# Patient Record
Sex: FEMALE | Race: WHITE | NOT HISPANIC OR LATINO | Employment: FULL TIME | URBAN - METROPOLITAN AREA
[De-identification: names, ages, dates, MRNs, and addresses within clinical notes are randomized per-mention and may not be internally consistent; named-entity substitution may affect disease eponyms.]

---

## 2023-01-19 PROBLEM — G47.00 INSOMNIA: Status: ACTIVE | Noted: 2023-01-19

## 2023-01-19 PROBLEM — N94.89 VAGINAL BURNING: Status: ACTIVE | Noted: 2023-01-19

## 2023-01-19 PROBLEM — N94.9 VAGINAL BURNING: Status: ACTIVE | Noted: 2023-01-19

## 2023-01-19 PROBLEM — K22.4 INEFFECTIVE ESOPHAGEAL MOTILITY: Status: ACTIVE | Noted: 2023-01-19

## 2023-01-19 PROBLEM — D72.819 LEUKOCYTOPENIA: Status: ACTIVE | Noted: 2023-01-19

## 2023-01-19 PROBLEM — M34.1: Status: ACTIVE | Noted: 2023-01-19

## 2023-01-19 PROBLEM — M35.1 MIXED CONNECTIVE TISSUE DISEASE (MULTI): Status: ACTIVE | Noted: 2023-01-19

## 2023-01-19 PROBLEM — K21.9 GERD (GASTROESOPHAGEAL REFLUX DISEASE): Status: ACTIVE | Noted: 2023-01-19

## 2023-01-19 PROBLEM — K20.90 ESOPHAGITIS: Status: ACTIVE | Noted: 2023-01-19

## 2023-01-19 RX ORDER — OMEPRAZOLE 40 MG/1
40 CAPSULE, DELAYED RELEASE ORAL
COMMUNITY
End: 2023-03-06 | Stop reason: SDUPTHER

## 2023-01-19 RX ORDER — TRAZODONE HYDROCHLORIDE 100 MG/1
TABLET ORAL NIGHTLY
COMMUNITY
End: 2023-03-06 | Stop reason: SDUPTHER

## 2023-03-06 ENCOUNTER — OFFICE VISIT (OUTPATIENT)
Dept: PRIMARY CARE | Facility: CLINIC | Age: 38
End: 2023-03-06
Payer: COMMERCIAL

## 2023-03-06 VITALS
HEIGHT: 65 IN | HEART RATE: 61 BPM | SYSTOLIC BLOOD PRESSURE: 92 MMHG | DIASTOLIC BLOOD PRESSURE: 64 MMHG | OXYGEN SATURATION: 98 % | BODY MASS INDEX: 22.82 KG/M2 | RESPIRATION RATE: 18 BRPM | WEIGHT: 137 LBS

## 2023-03-06 DIAGNOSIS — Z00.00 HEALTH MAINTENANCE EXAMINATION: ICD-10-CM

## 2023-03-06 DIAGNOSIS — G89.29 CHRONIC MIDLINE LOW BACK PAIN WITHOUT SCIATICA: ICD-10-CM

## 2023-03-06 DIAGNOSIS — G47.00 INSOMNIA, UNSPECIFIED TYPE: ICD-10-CM

## 2023-03-06 DIAGNOSIS — M54.50 CHRONIC MIDLINE LOW BACK PAIN WITHOUT SCIATICA: ICD-10-CM

## 2023-03-06 DIAGNOSIS — Z13.6 SCREENING FOR CARDIOVASCULAR CONDITION: ICD-10-CM

## 2023-03-06 DIAGNOSIS — K21.00 GASTROESOPHAGEAL REFLUX DISEASE WITH ESOPHAGITIS WITHOUT HEMORRHAGE: Primary | ICD-10-CM

## 2023-03-06 DIAGNOSIS — M34.1 SCLERODERMA OF ESOPHAGUS (MULTI): ICD-10-CM

## 2023-03-06 PROBLEM — K20.90 ESOPHAGITIS: Status: RESOLVED | Noted: 2023-01-19 | Resolved: 2023-03-06

## 2023-03-06 PROBLEM — N94.9 VAGINAL BURNING: Status: RESOLVED | Noted: 2023-01-19 | Resolved: 2023-03-06

## 2023-03-06 PROBLEM — D72.819 LEUKOCYTOPENIA: Status: RESOLVED | Noted: 2023-01-19 | Resolved: 2023-03-06

## 2023-03-06 PROBLEM — K22.4 INEFFECTIVE ESOPHAGEAL MOTILITY: Status: RESOLVED | Noted: 2023-01-19 | Resolved: 2023-03-06

## 2023-03-06 PROBLEM — N94.89 VAGINAL BURNING: Status: RESOLVED | Noted: 2023-01-19 | Resolved: 2023-03-06

## 2023-03-06 PROCEDURE — 99385 PREV VISIT NEW AGE 18-39: CPT | Performed by: NURSE PRACTITIONER

## 2023-03-06 PROCEDURE — 1036F TOBACCO NON-USER: CPT | Performed by: NURSE PRACTITIONER

## 2023-03-06 RX ORDER — OMEPRAZOLE 40 MG/1
40 CAPSULE, DELAYED RELEASE ORAL
Qty: 180 CAPSULE | Refills: 3 | Status: SHIPPED | OUTPATIENT
Start: 2023-03-06 | End: 2024-04-23 | Stop reason: SDUPTHER

## 2023-03-06 RX ORDER — TRAZODONE HYDROCHLORIDE 100 MG/1
100 TABLET ORAL DAILY PRN
Qty: 180 TABLET | Refills: 3 | Status: SHIPPED | OUTPATIENT
Start: 2023-03-06 | End: 2024-04-23 | Stop reason: SDUPTHER

## 2023-03-06 ASSESSMENT — PROMIS GLOBAL HEALTH SCALE
CARRYOUT_SOCIAL_ACTIVITIES: EXCELLENT
RATE_AVERAGE_PAIN: 3
RATE_GENERAL_HEALTH: EXCELLENT
RATE_PHYSICAL_HEALTH: VERY GOOD
RATE_SOCIAL_SATISFACTION: VERY GOOD
CARRYOUT_PHYSICAL_ACTIVITIES: COMPLETELY
RATE_QUALITY_OF_LIFE: VERY GOOD
RATE_MENTAL_HEALTH: VERY GOOD
EMOTIONAL_PROBLEMS: RARELY

## 2023-03-06 NOTE — PROGRESS NOTES
Subjective   Patient ID: Ca Felix is a 38 y.o. female who presents for No chief complaint on file..    HPI     Review of Systems    Objective   There were no vitals taken for this visit.    Physical Exam    Assessment/Plan

## 2023-03-06 NOTE — PROGRESS NOTES
Subjective   Patient ID: Ca Felix is a 38 y.o. female who presents for Annual Exam (Patient would also like to discuss her increasing LBP, reports no other concerns at this time. ).    Well Adult Physical   Patient here for a comprehensive physical exam.The patient reports problems - low back pain    Do you take any herbs or supplements that were not prescribed by a doctor? no   Are you taking calcium supplements? no   Are you taking aspirin daily? no     History:  LMP: No LMP recorded.  Last pap date:   Abnormal pap? no  : 0  Para: 0          Back Pain  This is a recurrent problem. The current episode started 1 to 4 weeks ago. The problem occurs intermittently. The problem has been waxing and waning since onset. The pain is present in the lumbar spine. The pain does not radiate. The pain is at a severity of 4/10. The pain is Worse during the night. The symptoms are aggravated by bending, twisting and standing. Stiffness is present All day. Pertinent negatives include no abdominal pain, bladder incontinence, bowel incontinence, dysuria, fever, headaches, leg pain, numbness, paresis, paresthesias, pelvic pain, perianal numbness, tingling, weakness or weight loss. She has tried chiropractic manipulation, NSAIDs, heat, analgesics, home exercises and muscle relaxant for the symptoms. The treatment provided moderate relief.      38 year old female presents for CPE    Last Pap 2 years ago - follows Dr. Koenig    Has had increased low back pain - has had chronic back pain x 'years'.   Denies numbness and tingling.  Denies changes in bowel or bladder habits.        Review of Systems   Constitutional:  Negative for fever and weight loss.   Gastrointestinal:  Negative for abdominal pain and bowel incontinence.   Genitourinary:  Negative for bladder incontinence, dysuria and pelvic pain.   Musculoskeletal:  Positive for back pain.   Neurological:  Negative for tingling, weakness, numbness, headaches and  "paresthesias.   All other systems reviewed and are negative.      Objective   BP 92/64   Pulse 61   Resp 18   Ht 1.651 m (5' 5\")   Wt 62.1 kg (137 lb)   SpO2 98%   BMI 22.80 kg/m²     Physical Exam  Vitals and nursing note reviewed.   Constitutional:       General: She is not in acute distress.     Appearance: Normal appearance.   HENT:      Head: Normocephalic and atraumatic.      Right Ear: Tympanic membrane, ear canal and external ear normal. There is no impacted cerumen.      Left Ear: Tympanic membrane, ear canal and external ear normal. There is no impacted cerumen.      Nose: Nose normal. No congestion.      Mouth/Throat:      Mouth: Mucous membranes are moist.      Pharynx: No oropharyngeal exudate.   Eyes:      Extraocular Movements: Extraocular movements intact.      Conjunctiva/sclera: Conjunctivae normal.      Pupils: Pupils are equal, round, and reactive to light.   Cardiovascular:      Rate and Rhythm: Normal rate and regular rhythm.      Pulses: Normal pulses.      Heart sounds: Normal heart sounds.   Pulmonary:      Effort: Pulmonary effort is normal.      Breath sounds: Normal breath sounds.   Abdominal:      General: Abdomen is flat. Bowel sounds are normal.      Palpations: Abdomen is soft.   Musculoskeletal:         General: Tenderness present. Normal range of motion.      Cervical back: Normal range of motion.      Comments: TTP low lumbar region approx L3-L5 midline in location   Skin:     General: Skin is warm.      Capillary Refill: Capillary refill takes less than 2 seconds.   Neurological:      General: No focal deficit present.      Mental Status: She is alert. Mental status is at baseline. She is disoriented.      Coordination: Coordination normal.      Gait: Gait normal.   Psychiatric:         Mood and Affect: Mood normal.         Behavior: Behavior normal.         Thought Content: Thought content normal.         Judgment: Judgment normal.         Assessment/Plan   Problem List " Items Addressed This Visit          Nervous    Insomnia       Digestive    GERD (gastroesophageal reflux disease) - Primary     Follows GI  Stable   Taking omeprazole          Relevant Medications    traZODone (Desyrel) 100 mg tablet    omeprazole (PriLOSEC) 40 mg DR capsule    Scleroderma of esophagus (CMS/HCC)     Follows GI  Stable   Taking omeprazole             Other    Health maintenance examination     Cbc,cmp, lipid, vit d, vit b, TSH  Pap UTD due 2026  Mammogram due at age 41         Relevant Orders    CBC    Comprehensive Metabolic Panel    Vitamin D 1,25 Dihydroxy    Vitamin B12    TSH with reflex to Free T4 if abnormal    Chronic midline low back pain without sciatica     Had improvement with medrol dose pack and NSAIDS  Pain will return every few months  Xray lumbar region         Relevant Orders    XR lumbar spine complete 4+ views (Completed)     Other Visit Diagnoses       Screening for cardiovascular condition        Relevant Orders    Lipid Panel

## 2023-03-12 PROBLEM — G89.29 CHRONIC MIDLINE LOW BACK PAIN WITHOUT SCIATICA: Status: ACTIVE | Noted: 2023-03-12

## 2023-03-12 PROBLEM — M54.50 CHRONIC MIDLINE LOW BACK PAIN WITHOUT SCIATICA: Status: ACTIVE | Noted: 2023-03-12

## 2023-03-12 PROBLEM — M35.1 MIXED CONNECTIVE TISSUE DISEASE (MULTI): Status: RESOLVED | Noted: 2023-01-19 | Resolved: 2023-03-12

## 2023-03-12 ASSESSMENT — ENCOUNTER SYMPTOMS
PARESTHESIAS: 0
FEVER: 0
PARESIS: 0
NUMBNESS: 0
DYSURIA: 0
HEADACHES: 0
WEIGHT LOSS: 0
ABDOMINAL PAIN: 0
BACK PAIN: 1
WEAKNESS: 0
PERIANAL NUMBNESS: 0
TINGLING: 0
BOWEL INCONTINENCE: 0
LEG PAIN: 0

## 2023-03-12 NOTE — ASSESSMENT & PLAN NOTE
Had improvement with medrol dose pack and NSAIDS  Pain will return every few months  Xray lumbar region

## 2023-03-14 DIAGNOSIS — M54.50 CHRONIC MIDLINE LOW BACK PAIN WITHOUT SCIATICA: Primary | ICD-10-CM

## 2023-03-14 DIAGNOSIS — G89.29 CHRONIC MIDLINE LOW BACK PAIN WITHOUT SCIATICA: Primary | ICD-10-CM

## 2023-10-25 ENCOUNTER — APPOINTMENT (OUTPATIENT)
Dept: PRIMARY CARE | Facility: CLINIC | Age: 38
End: 2023-10-25
Payer: COMMERCIAL

## 2023-11-22 ENCOUNTER — APPOINTMENT (OUTPATIENT)
Dept: PRIMARY CARE | Facility: CLINIC | Age: 38
End: 2023-11-22
Payer: COMMERCIAL

## 2023-12-01 ENCOUNTER — APPOINTMENT (OUTPATIENT)
Dept: PRIMARY CARE | Facility: CLINIC | Age: 38
End: 2023-12-01
Payer: COMMERCIAL

## 2024-03-14 ASSESSMENT — PROMIS GLOBAL HEALTH SCALE
RATE_GENERAL_HEALTH: VERY GOOD
RATE_MENTAL_HEALTH: GOOD
EMOTIONAL_PROBLEMS: OFTEN
RATE_QUALITY_OF_LIFE: GOOD
RATE_PHYSICAL_HEALTH: EXCELLENT
RATE_AVERAGE_PAIN: 1
CARRYOUT_SOCIAL_ACTIVITIES: VERY GOOD
RATE_AVERAGE_FATIGUE: MILD
RATE_SOCIAL_SATISFACTION: GOOD
CARRYOUT_PHYSICAL_ACTIVITIES: COMPLETELY

## 2024-03-18 ENCOUNTER — OFFICE VISIT (OUTPATIENT)
Dept: PRIMARY CARE | Facility: CLINIC | Age: 39
End: 2024-03-18
Payer: COMMERCIAL

## 2024-03-18 VITALS
HEIGHT: 65 IN | BODY MASS INDEX: 22.26 KG/M2 | DIASTOLIC BLOOD PRESSURE: 70 MMHG | WEIGHT: 133.6 LBS | RESPIRATION RATE: 18 BRPM | OXYGEN SATURATION: 99 % | SYSTOLIC BLOOD PRESSURE: 110 MMHG | HEART RATE: 71 BPM

## 2024-03-18 DIAGNOSIS — Z13.6 SCREENING FOR CARDIOVASCULAR CONDITION: ICD-10-CM

## 2024-03-18 DIAGNOSIS — F41.8 ANXIETY WITH DEPRESSION: ICD-10-CM

## 2024-03-18 DIAGNOSIS — Z00.00 HEALTH MAINTENANCE EXAMINATION: Primary | ICD-10-CM

## 2024-03-18 PROCEDURE — 1036F TOBACCO NON-USER: CPT | Performed by: NURSE PRACTITIONER

## 2024-03-18 PROCEDURE — 99395 PREV VISIT EST AGE 18-39: CPT | Performed by: NURSE PRACTITIONER

## 2024-03-18 RX ORDER — FLUOXETINE 10 MG/1
10 CAPSULE ORAL DAILY
Qty: 30 CAPSULE | Refills: 1 | Status: SHIPPED | OUTPATIENT
Start: 2024-03-18 | End: 2024-04-09

## 2024-03-18 ASSESSMENT — PATIENT HEALTH QUESTIONNAIRE - PHQ9
7. TROUBLE CONCENTRATING ON THINGS, SUCH AS READING THE NEWSPAPER OR WATCHING TELEVISION: MORE THAN HALF THE DAYS
9. THOUGHTS THAT YOU WOULD BE BETTER OFF DEAD, OR OF HURTING YOURSELF: NOT AT ALL
8. MOVING OR SPEAKING SO SLOWLY THAT OTHER PEOPLE COULD HAVE NOTICED. OR THE OPPOSITE, BEING SO FIGETY OR RESTLESS THAT YOU HAVE BEEN MOVING AROUND A LOT MORE THAN USUAL: SEVERAL DAYS
4. FEELING TIRED OR HAVING LITTLE ENERGY: NEARLY EVERY DAY
10. IF YOU CHECKED OFF ANY PROBLEMS, HOW DIFFICULT HAVE THESE PROBLEMS MADE IT FOR YOU TO DO YOUR WORK, TAKE CARE OF THINGS AT HOME, OR GET ALONG WITH OTHER PEOPLE: VERY DIFFICULT
5. POOR APPETITE OR OVEREATING: NOT AT ALL
SUM OF ALL RESPONSES TO PHQ QUESTIONS 1-9: 17
SUM OF ALL RESPONSES TO PHQ9 QUESTIONS 1 AND 2: 6
1. LITTLE INTEREST OR PLEASURE IN DOING THINGS: NEARLY EVERY DAY
2. FEELING DOWN, DEPRESSED OR HOPELESS: NEARLY EVERY DAY
3. TROUBLE FALLING OR STAYING ASLEEP OR SLEEPING TOO MUCH: MORE THAN HALF THE DAYS
6. FEELING BAD ABOUT YOURSELF - OR THAT YOU ARE A FAILURE OR HAVE LET YOURSELF OR YOUR FAMILY DOWN: NEARLY EVERY DAY

## 2024-03-18 NOTE — PROGRESS NOTES
"Subjective   Patient ID: Ca Felix is a 39 y.o. female who presents for Annual Exam (Ca is in today for annual CPE, she would also like to discuss ongoing depression and anxiety. ).    Well Adult Physical   Patient here for a comprehensive physical exam.The patient reports problems - depression and anxiety    Do you take any herbs or supplements that were not prescribed by a doctor? no   Are you taking calcium supplements? no   Are you taking aspirin daily? no     History:  LMP:   Last pap date: due   Abnormal pap? 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  After 2 years she started seeing someone but broke up 6 months ago.  Felt that the depression is worse with this breakup then with her divorce.  She will feel lonely but dread going out to store or hanging out with someone.  She denies SI   She is working 3 nights a week              Review of Systems   All other systems reviewed and are negative.      Objective   /70   Pulse 71   Resp 18   Ht 1.651 m (5' 5\")   Wt 60.6 kg (133 lb 9.6 oz)   LMP 2024 (Exact Date)   SpO2 99%   BMI 22.23 kg/m²     Physical Exam  Vitals and nursing note reviewed.   Constitutional:       General: She is not in acute distress.     Appearance: Normal appearance. She is normal weight.   HENT:      Head: Normocephalic and atraumatic.      Right Ear: Tympanic membrane, ear canal and external ear normal.      Left Ear: Tympanic membrane, ear canal and external ear normal.      Nose: Nose normal.      Mouth/Throat:      Mouth: Mucous membranes are moist.      Pharynx: Oropharynx is clear.   Eyes:      Extraocular Movements: Extraocular movements intact.      Conjunctiva/sclera: Conjunctivae normal.      Pupils: Pupils are equal, round, and reactive to light.   Neck:      Vascular: No carotid bruit.   Cardiovascular:      Rate and Rhythm: Normal " rate and regular rhythm.      Pulses: Normal pulses.      Heart sounds: Normal heart sounds.   Pulmonary:      Effort: Pulmonary effort is normal.      Breath sounds: Normal breath sounds.   Abdominal:      General: Bowel sounds are normal. There is no distension.      Palpations: Abdomen is soft.      Tenderness: There is no abdominal tenderness.   Musculoskeletal:         General: Normal range of motion.      Cervical back: Normal range of motion and neck supple.      Right lower leg: No edema.      Left lower leg: No edema.   Lymphadenopathy:      Cervical: No cervical adenopathy.   Skin:     General: Skin is warm and dry.      Capillary Refill: Capillary refill takes less than 2 seconds.   Neurological:      General: No focal deficit present.      Mental Status: She is alert and oriented to person, place, and time. Mental status is at baseline.      Motor: No weakness.   Psychiatric:         Mood and Affect: Mood normal.         Behavior: Behavior normal.         Thought Content: Thought content normal.         Judgment: Judgment normal.         Assessment/Plan   Problem List Items Addressed This Visit             ICD-10-CM    Health maintenance examination - Primary Z00.00     Cbc,cmp, lipid, vit d, vit b, TSH  Pap UTD due 2026  Mammogram due at age 41  Follows Dr. Koenig for GYN  G0  Immunizations up to date         Relevant Orders    CBC    Comprehensive Metabolic Panel    TSH with reflex to Free T4 if abnormal    Anxiety with depression F41.8     PHQ 17  Different medications discussed  Start fluoxetine 10 mg daily  Cbc,cmp, tsh, vitamin d, vitamin b, iron  Follow up in 4-6 weeks  Side effects discussed if bothersome please contact         Relevant Medications    FLUoxetine (PROzac) 10 mg capsule    Other Relevant Orders    Vitamin B12    Vitamin D 25-Hydroxy,Total (for eval of Vitamin D levels)    Iron and TIBC     Other Visit Diagnoses         Codes    Screening for cardiovascular condition     Z13.6     Relevant Orders    Lipid Panel

## 2024-03-18 NOTE — ASSESSMENT & PLAN NOTE
Cbc,cmp, lipid, vit d, vit b, TSH  Pap UTD due 2026  Mammogram due at age 41  Follows Dr. Koenig for GYN  G0  Immunizations up to date

## 2024-03-18 NOTE — ASSESSMENT & PLAN NOTE
PHQ 17  Different medications discussed  Start fluoxetine 10 mg daily  Cbc,cmp, tsh, vitamin d, vitamin b, iron  Follow up in 4-6 weeks  Side effects discussed if bothersome please contact

## 2024-03-18 NOTE — PATIENT INSTRUCTIONS
Start the fluoxetine - take once a day every day  Have the labs drawn to check if anything is making the depression worse  I recommend talking with a counselor - 2 places that do counseling are [facility] - in [place] [phone]  Or [facility] [phone]  Follow up 4-6 weeks for medication

## 2024-04-09 DIAGNOSIS — F41.8 ANXIETY WITH DEPRESSION: ICD-10-CM

## 2024-04-09 RX ORDER — FLUOXETINE 10 MG/1
10 CAPSULE ORAL DAILY
Qty: 90 CAPSULE | Refills: 0 | Status: SHIPPED | OUTPATIENT
Start: 2024-04-09 | End: 2024-04-23 | Stop reason: SDUPTHER

## 2024-04-20 ENCOUNTER — LAB (OUTPATIENT)
Dept: LAB | Facility: LAB | Age: 39
End: 2024-04-20
Payer: COMMERCIAL

## 2024-04-20 DIAGNOSIS — F41.8 ANXIETY WITH DEPRESSION: ICD-10-CM

## 2024-04-20 DIAGNOSIS — Z13.6 SCREENING FOR CARDIOVASCULAR CONDITION: ICD-10-CM

## 2024-04-20 DIAGNOSIS — Z00.00 HEALTH MAINTENANCE EXAMINATION: ICD-10-CM

## 2024-04-20 LAB
25(OH)D3 SERPL-MCNC: 60 NG/ML (ref 30–100)
ALBUMIN SERPL BCP-MCNC: 4.4 G/DL (ref 3.4–5)
ALP SERPL-CCNC: 44 U/L (ref 33–110)
ALT SERPL W P-5'-P-CCNC: 11 U/L (ref 7–45)
ANION GAP SERPL CALC-SCNC: 12 MMOL/L (ref 10–20)
AST SERPL W P-5'-P-CCNC: 16 U/L (ref 9–39)
BILIRUB SERPL-MCNC: 0.5 MG/DL (ref 0–1.2)
BUN SERPL-MCNC: 13 MG/DL (ref 6–23)
CALCIUM SERPL-MCNC: 8.8 MG/DL (ref 8.6–10.3)
CHLORIDE SERPL-SCNC: 102 MMOL/L (ref 98–107)
CHOLEST SERPL-MCNC: 200 MG/DL (ref 0–199)
CHOLESTEROL/HDL RATIO: 3
CO2 SERPL-SCNC: 27 MMOL/L (ref 21–32)
CREAT SERPL-MCNC: 0.85 MG/DL (ref 0.5–1.05)
EGFRCR SERPLBLD CKD-EPI 2021: 90 ML/MIN/1.73M*2
ERYTHROCYTE [DISTWIDTH] IN BLOOD BY AUTOMATED COUNT: 12.4 % (ref 11.5–14.5)
GLUCOSE SERPL-MCNC: 73 MG/DL (ref 74–99)
HCT VFR BLD AUTO: 39.6 % (ref 36–46)
HDLC SERPL-MCNC: 66.9 MG/DL
HGB BLD-MCNC: 12.8 G/DL (ref 12–16)
IRON SATN MFR SERPL: 22 % (ref 25–45)
IRON SERPL-MCNC: 58 UG/DL (ref 35–150)
LDLC SERPL CALC-MCNC: 122 MG/DL
MCH RBC QN AUTO: 31.6 PG (ref 26–34)
MCHC RBC AUTO-ENTMCNC: 32.3 G/DL (ref 32–36)
MCV RBC AUTO: 98 FL (ref 80–100)
NON HDL CHOLESTEROL: 133 MG/DL (ref 0–149)
NRBC BLD-RTO: 0 /100 WBCS (ref 0–0)
PLATELET # BLD AUTO: 198 X10*3/UL (ref 150–450)
POTASSIUM SERPL-SCNC: 4 MMOL/L (ref 3.5–5.3)
PROT SERPL-MCNC: 6.7 G/DL (ref 6.4–8.2)
RBC # BLD AUTO: 4.05 X10*6/UL (ref 4–5.2)
SODIUM SERPL-SCNC: 137 MMOL/L (ref 136–145)
TIBC SERPL-MCNC: 259 UG/DL (ref 240–445)
TRIGL SERPL-MCNC: 56 MG/DL (ref 0–149)
TSH SERPL-ACNC: 1.95 MIU/L (ref 0.44–3.98)
UIBC SERPL-MCNC: 201 UG/DL (ref 110–370)
VIT B12 SERPL-MCNC: 354 PG/ML (ref 211–911)
VLDL: 11 MG/DL (ref 0–40)
WBC # BLD AUTO: 3 X10*3/UL (ref 4.4–11.3)

## 2024-04-20 PROCEDURE — 82607 VITAMIN B-12: CPT

## 2024-04-20 PROCEDURE — 80061 LIPID PANEL: CPT

## 2024-04-20 PROCEDURE — 82306 VITAMIN D 25 HYDROXY: CPT

## 2024-04-20 PROCEDURE — 84443 ASSAY THYROID STIM HORMONE: CPT

## 2024-04-20 PROCEDURE — 80053 COMPREHEN METABOLIC PANEL: CPT

## 2024-04-20 PROCEDURE — 83550 IRON BINDING TEST: CPT

## 2024-04-20 PROCEDURE — 85027 COMPLETE CBC AUTOMATED: CPT

## 2024-04-20 PROCEDURE — 83540 ASSAY OF IRON: CPT

## 2024-04-20 PROCEDURE — 36415 COLL VENOUS BLD VENIPUNCTURE: CPT

## 2024-04-22 ENCOUNTER — APPOINTMENT (OUTPATIENT)
Dept: PRIMARY CARE | Facility: CLINIC | Age: 39
End: 2024-04-22
Payer: COMMERCIAL

## 2024-04-23 DIAGNOSIS — K21.00 GASTROESOPHAGEAL REFLUX DISEASE WITH ESOPHAGITIS WITHOUT HEMORRHAGE: ICD-10-CM

## 2024-04-23 DIAGNOSIS — F41.8 ANXIETY WITH DEPRESSION: ICD-10-CM

## 2024-04-23 RX ORDER — FLUOXETINE HYDROCHLORIDE 20 MG/1
20 CAPSULE ORAL DAILY
Qty: 30 CAPSULE | Refills: 2 | Status: SHIPPED | OUTPATIENT
Start: 2024-04-23 | End: 2024-05-28

## 2024-04-23 RX ORDER — OMEPRAZOLE 40 MG/1
40 CAPSULE, DELAYED RELEASE ORAL
Qty: 180 CAPSULE | Refills: 1 | Status: SHIPPED | OUTPATIENT
Start: 2024-04-23 | End: 2024-10-20

## 2024-04-23 RX ORDER — TRAZODONE HYDROCHLORIDE 100 MG/1
100 TABLET ORAL DAILY PRN
Qty: 180 TABLET | Refills: 0 | Status: SHIPPED | OUTPATIENT
Start: 2024-04-23 | End: 2024-10-20

## 2024-05-25 DIAGNOSIS — F41.8 ANXIETY WITH DEPRESSION: ICD-10-CM

## 2024-05-28 RX ORDER — FLUOXETINE HYDROCHLORIDE 20 MG/1
20 CAPSULE ORAL DAILY
Qty: 90 CAPSULE | Refills: 0 | Status: SHIPPED | OUTPATIENT
Start: 2024-05-28

## 2024-06-10 ENCOUNTER — APPOINTMENT (OUTPATIENT)
Dept: PRIMARY CARE | Facility: CLINIC | Age: 39
End: 2024-06-10
Payer: COMMERCIAL

## 2024-07-23 ENCOUNTER — APPOINTMENT (OUTPATIENT)
Dept: PRIMARY CARE | Facility: CLINIC | Age: 39
End: 2024-07-23
Payer: COMMERCIAL

## 2024-07-23 VITALS
WEIGHT: 132 LBS | BODY MASS INDEX: 21.99 KG/M2 | SYSTOLIC BLOOD PRESSURE: 108 MMHG | OXYGEN SATURATION: 96 % | HEART RATE: 62 BPM | HEIGHT: 65 IN | DIASTOLIC BLOOD PRESSURE: 74 MMHG

## 2024-07-23 DIAGNOSIS — F41.8 ANXIETY WITH DEPRESSION: Primary | ICD-10-CM

## 2024-07-23 PROCEDURE — 1036F TOBACCO NON-USER: CPT | Performed by: NURSE PRACTITIONER

## 2024-07-23 PROCEDURE — 99213 OFFICE O/P EST LOW 20 MIN: CPT | Performed by: NURSE PRACTITIONER

## 2024-07-23 PROCEDURE — 3008F BODY MASS INDEX DOCD: CPT | Performed by: NURSE PRACTITIONER

## 2024-07-23 RX ORDER — FLUOXETINE 10 MG/1
10 CAPSULE ORAL DAILY
Qty: 30 CAPSULE | Refills: 11 | Status: SHIPPED | OUTPATIENT
Start: 2024-07-23 | End: 2025-07-23

## 2024-07-23 RX ORDER — FLUOXETINE HYDROCHLORIDE 20 MG/1
20 CAPSULE ORAL DAILY
Qty: 90 CAPSULE | Refills: 3 | Status: SHIPPED | OUTPATIENT
Start: 2024-07-23 | End: 2025-07-23

## 2024-07-23 ASSESSMENT — PATIENT HEALTH QUESTIONNAIRE - PHQ9
2. FEELING DOWN, DEPRESSED OR HOPELESS: SEVERAL DAYS
10. IF YOU CHECKED OFF ANY PROBLEMS, HOW DIFFICULT HAVE THESE PROBLEMS MADE IT FOR YOU TO DO YOUR WORK, TAKE CARE OF THINGS AT HOME, OR GET ALONG WITH OTHER PEOPLE: NOT DIFFICULT AT ALL
SUM OF ALL RESPONSES TO PHQ9 QUESTIONS 1 AND 2: 2
1. LITTLE INTEREST OR PLEASURE IN DOING THINGS: SEVERAL DAYS

## 2024-07-23 NOTE — PATIENT INSTRUCTIONS
Take the 20 mg fluoxetine.  I have added 10 mg fluoxetine for a total of 30mg.  Can take these all at the same time.  If your anxiety worsens or does not improve please let me know right away  Follow up with new pcp

## 2024-07-23 NOTE — PROGRESS NOTES
"Subjective   Patient ID: Ca Felix is a 39 y.o. female who presents for Follow-up (4-6 follow on medication ).    Presents to follow up for anxiety.  Has been taking fluoxetine 20 mg daily.  She has felt an increase in her desire to do things.  She will still note that she will have 1 or 2 days a week where she loses motivation to do stuff and would like to discuss possibly increasing fluoxetine  She has applied to do a transfer of her job closer to home         Review of Systems   All other systems reviewed and are negative.      Objective   /74   Pulse 62   Ht 1.651 m (5' 5\")   Wt 59.9 kg (132 lb)   SpO2 96%   BMI 21.97 kg/m²     Physical Exam  Vitals and nursing note reviewed.   Constitutional:       General: She is not in acute distress.     Appearance: Normal appearance.   HENT:      Head: Normocephalic and atraumatic.      Right Ear: Tympanic membrane, ear canal and external ear normal.      Left Ear: Tympanic membrane, ear canal and external ear normal.      Nose: Nose normal.      Mouth/Throat:      Mouth: Mucous membranes are moist.      Pharynx: Oropharynx is clear.   Eyes:      Extraocular Movements: Extraocular movements intact.      Conjunctiva/sclera: Conjunctivae normal.      Pupils: Pupils are equal, round, and reactive to light.   Neck:      Vascular: No carotid bruit.   Cardiovascular:      Rate and Rhythm: Normal rate and regular rhythm.      Pulses: Normal pulses.      Heart sounds: Normal heart sounds.   Pulmonary:      Effort: Pulmonary effort is normal.      Breath sounds: Normal breath sounds.   Abdominal:      General: Bowel sounds are normal. There is no distension.      Palpations: Abdomen is soft.      Tenderness: There is no abdominal tenderness.   Musculoskeletal:         General: Normal range of motion.      Cervical back: Normal range of motion and neck supple.      Right lower leg: No edema.      Left lower leg: No edema.   Lymphadenopathy:      Cervical: No cervical " adenopathy.   Skin:     General: Skin is warm and dry.      Capillary Refill: Capillary refill takes less than 2 seconds.   Neurological:      General: No focal deficit present.      Mental Status: She is alert and oriented to person, place, and time. Mental status is at baseline.      Motor: No weakness.   Psychiatric:         Mood and Affect: Mood normal.         Behavior: Behavior normal.         Thought Content: Thought content normal.         Judgment: Judgment normal.         Assessment/Plan   Problem List Items Addressed This Visit             ICD-10-CM    Anxiety with depression - Primary F41.8     Greatly improved  Would like to try a higher dose   discussed increasing fluoxetine to 30 mg once daily  If she has increased anxiety or does not feel any difference within the next 2 weeks she will contact me and we will adjust her dose         Relevant Medications    FLUoxetine (PROzac) 10 mg capsule    FLUoxetine (PROzac) 20 mg capsule

## 2024-07-23 NOTE — ASSESSMENT & PLAN NOTE
Greatly improved  Would like to try a higher dose   discussed increasing fluoxetine to 30 mg once daily  If she has increased anxiety or does not feel any difference within the next 2 weeks she will contact me and we will adjust her dose

## 2024-11-21 ENCOUNTER — OFFICE VISIT (OUTPATIENT)
Dept: URGENT CARE | Age: 39
End: 2024-11-21
Payer: COMMERCIAL

## 2024-11-21 VITALS
WEIGHT: 129 LBS | DIASTOLIC BLOOD PRESSURE: 60 MMHG | HEIGHT: 65 IN | OXYGEN SATURATION: 99 % | TEMPERATURE: 98 F | SYSTOLIC BLOOD PRESSURE: 122 MMHG | HEART RATE: 70 BPM | RESPIRATION RATE: 18 BRPM | BODY MASS INDEX: 21.49 KG/M2

## 2024-11-21 DIAGNOSIS — M62.838 MUSCLE SPASM: Primary | ICD-10-CM

## 2024-11-21 RX ORDER — METHYLPREDNISOLONE 4 MG/1
TABLET ORAL
Qty: 21 TABLET | Refills: 0 | Status: SHIPPED | OUTPATIENT
Start: 2024-11-21

## 2024-11-21 RX ORDER — CYCLOBENZAPRINE HCL 5 MG
5 TABLET ORAL 3 TIMES DAILY
Qty: 30 TABLET | Refills: 0 | Status: SHIPPED | OUTPATIENT
Start: 2024-11-21 | End: 2024-12-01

## 2024-11-21 ASSESSMENT — ENCOUNTER SYMPTOMS
FEVER: 0
SINUS PRESSURE: 0
COUGH: 0
APPETITE CHANGE: 0
VOMITING: 0
CHEST TIGHTNESS: 0
BACK PAIN: 0
ARTHRALGIAS: 0
DIARRHEA: 0
NECK STIFFNESS: 1
SHORTNESS OF BREATH: 0
EYE PAIN: 0
ABDOMINAL PAIN: 0
DIFFICULTY URINATING: 0
CHILLS: 0
NECK PAIN: 1
RHINORRHEA: 0
PALPITATIONS: 0
NAUSEA: 0
CONSTIPATION: 0
DIZZINESS: 0
SINUS PAIN: 0
SORE THROAT: 0
HEADACHES: 0
TROUBLE SWALLOWING: 0
WOUND: 0
FATIGUE: 0
MYALGIAS: 1

## 2024-11-21 ASSESSMENT — VISUAL ACUITY: OU: 1

## 2024-11-21 ASSESSMENT — PAIN SCALES - GENERAL: PAINLEVEL_OUTOF10: 2
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
## 2024-12-26 ENCOUNTER — TELEPHONE (OUTPATIENT)
Dept: OBSTETRICS AND GYNECOLOGY | Facility: CLINIC | Age: 39
End: 2024-12-26
Payer: COMMERCIAL

## 2025-01-24 ENCOUNTER — APPOINTMENT (OUTPATIENT)
Dept: OBSTETRICS AND GYNECOLOGY | Facility: CLINIC | Age: 40
End: 2025-01-24
Payer: COMMERCIAL

## 2025-02-19 SDOH — SOCIAL STABILITY: SOCIAL NETWORK: I HAVE TROUBLE DOING ALL OF THE ACTIVITIES WITH FRIENDS THAT I WANT TO DO: NEVER

## 2025-02-19 SDOH — SOCIAL STABILITY: SOCIAL NETWORK: I HAVE TROUBLE DOING ALL OF MY REGULAR LEISURE ACTIVITIES WITH OTHERS: NEVER

## 2025-02-19 SDOH — ECONOMIC STABILITY: FOOD INSECURITY: WITHIN THE PAST 12 MONTHS, YOU WORRIED THAT YOUR FOOD WOULD RUN OUT BEFORE YOU GOT MONEY TO BUY MORE.: NEVER TRUE

## 2025-02-19 SDOH — SOCIAL STABILITY: SOCIAL NETWORK: I HAVE TROUBLE DOING ALL OF THE FAMILY ACTIVITIES THAT I WANT TO DO: NEVER

## 2025-02-19 SDOH — SOCIAL STABILITY: SOCIAL NETWORK

## 2025-02-19 SDOH — ECONOMIC STABILITY: FOOD INSECURITY: WITHIN THE PAST 12 MONTHS, THE FOOD YOU BOUGHT JUST DIDN'T LAST AND YOU DIDN'T HAVE MONEY TO GET MORE.: NEVER TRUE

## 2025-02-19 SDOH — SOCIAL STABILITY: SOCIAL NETWORK: I HAVE TROUBLE DOING ALL OF MY USUAL WORK (INCLUDE WORK AT HOME): NEVER

## 2025-02-19 SDOH — SOCIAL STABILITY: SOCIAL NETWORK: PROMIS ABILITY TO PARTICIPATE IN SOCIAL ROLES & ACTIVITIES T-SCORE: 64

## 2025-02-19 ASSESSMENT — PROMIS GLOBAL HEALTH SCALE
RATE_PHYSICAL_HEALTH: VERY GOOD
RATE_AVERAGE_PAIN: 1
RATE_GENERAL_HEALTH: EXCELLENT
EMOTIONAL_PROBLEMS: SOMETIMES
CARRYOUT_SOCIAL_ACTIVITIES: VERY GOOD
RATE_SOCIAL_SATISFACTION: VERY GOOD
RATE_QUALITY_OF_LIFE: VERY GOOD
RATE_MENTAL_HEALTH: VERY GOOD
RATE_AVERAGE_FATIGUE: MILD
CARRYOUT_PHYSICAL_ACTIVITIES: COMPLETELY

## 2025-02-19 ASSESSMENT — ANXIETY QUESTIONNAIRES
PAST MONTH HOW OFTEN HAVE YOU FELT THAT YOU WERE UNABLE TO CONTROL THE IMPORTANT THINGS IN YOUR LIFE: 0 - NEVER
PAST MONTH HOW OFTEN HAVE YOU FELT CONFIDENT ABOUT YOUR ABILITY TO HANDLE YOUR PROBLEMS: 4 - VERY OFTEN
PAST MONTH HOW OFTEN HAVE YOU FELT DIFFICULTIES WERE PILING UP SO HIGH THAT YOU COULD NOT OVERCOME THEM: 0 - NEVER
PAST MONTH HOW OFTEN HAVE YOU FELT CONFIDENT ABOUT YOUR ABILITY TO HANDLE YOUR PROBLEMS: 4 - VERY OFTEN
PAST MONTH HOW OFTEN HAVE YOU FELT THAT YOU WERE UNABLE TO CONTROL THE IMPORTANT THINGS IN YOUR LIFE: 0 - NEVER
PAST MONTH HOW OFTEN HAVE YOU FELT THAT THINGS WERE GOING YOUR WAY: 4 - VERY OFTEN

## 2025-02-20 ENCOUNTER — APPOINTMENT (OUTPATIENT)
Dept: INTEGRATIVE MEDICINE | Facility: CLINIC | Age: 40
End: 2025-02-20
Payer: COMMERCIAL

## 2025-02-20 DIAGNOSIS — M79.10 MYALGIA: ICD-10-CM

## 2025-02-20 DIAGNOSIS — M54.59 MECHANICAL LOW BACK PAIN: ICD-10-CM

## 2025-02-20 DIAGNOSIS — M99.02 SEGMENTAL AND SOMATIC DYSFUNCTION OF THORACIC REGION: ICD-10-CM

## 2025-02-20 DIAGNOSIS — M99.04 SEGMENTAL AND SOMATIC DYSFUNCTION OF SACRAL REGION: ICD-10-CM

## 2025-02-20 DIAGNOSIS — M79.9 POSTURAL STRAIN: ICD-10-CM

## 2025-02-20 DIAGNOSIS — M99.03 SEGMENTAL AND SOMATIC DYSFUNCTION OF LUMBAR REGION: Primary | ICD-10-CM

## 2025-02-20 DIAGNOSIS — M99.05 SEGMENTAL AND SOMATIC DYSFUNCTION OF PELVIC REGION: ICD-10-CM

## 2025-02-20 PROCEDURE — 99203 OFFICE O/P NEW LOW 30 MIN: CPT | Performed by: CHIROPRACTOR

## 2025-02-20 PROCEDURE — 98941 CHIROPRACT MANJ 3-4 REGIONS: CPT | Performed by: CHIROPRACTOR

## 2025-02-21 ENCOUNTER — APPOINTMENT (OUTPATIENT)
Dept: OBSTETRICS AND GYNECOLOGY | Facility: CLINIC | Age: 40
End: 2025-02-21
Payer: COMMERCIAL

## 2025-02-21 VITALS
SYSTOLIC BLOOD PRESSURE: 108 MMHG | HEIGHT: 65 IN | BODY MASS INDEX: 22.33 KG/M2 | DIASTOLIC BLOOD PRESSURE: 68 MMHG | WEIGHT: 134 LBS

## 2025-02-21 DIAGNOSIS — Z11.3 SCREEN FOR STD (SEXUALLY TRANSMITTED DISEASE): ICD-10-CM

## 2025-02-21 DIAGNOSIS — Z01.419 WELL WOMAN EXAM WITH ROUTINE GYNECOLOGICAL EXAM: Primary | ICD-10-CM

## 2025-02-21 DIAGNOSIS — Z12.4 ROUTINE CERVICAL SMEAR: ICD-10-CM

## 2025-02-21 DIAGNOSIS — F41.8 ANXIETY WITH DEPRESSION: ICD-10-CM

## 2025-02-21 PROCEDURE — 87624 HPV HI-RISK TYP POOLED RSLT: CPT

## 2025-02-21 PROCEDURE — 3008F BODY MASS INDEX DOCD: CPT | Performed by: OBSTETRICS & GYNECOLOGY

## 2025-02-21 PROCEDURE — 1036F TOBACCO NON-USER: CPT | Performed by: OBSTETRICS & GYNECOLOGY

## 2025-02-21 PROCEDURE — 99385 PREV VISIT NEW AGE 18-39: CPT | Performed by: OBSTETRICS & GYNECOLOGY

## 2025-02-21 PROCEDURE — 87591 N.GONORRHOEAE DNA AMP PROB: CPT

## 2025-02-21 PROCEDURE — 87491 CHLMYD TRACH DNA AMP PROBE: CPT

## 2025-02-21 RX ORDER — FLUOXETINE HYDROCHLORIDE 20 MG/1
20 CAPSULE ORAL DAILY
Qty: 90 CAPSULE | Refills: 0 | Status: SHIPPED | OUTPATIENT
Start: 2025-02-21 | End: 2026-02-21

## 2025-02-21 RX ORDER — FLUOXETINE 10 MG/1
10 CAPSULE ORAL DAILY
Qty: 30 CAPSULE | Refills: 0 | Status: SHIPPED | OUTPATIENT
Start: 2025-02-21 | End: 2026-02-21

## 2025-02-21 NOTE — PROGRESS NOTES
"  HPI  Ca Felix is a 39 y.o.  presents for annual GYN well woman exam.   No changes in personal or FH since last visit.   Getting new PCP. Would like prozac refilled.  Amenable to STI screening  Does not want hormonal contraception at this time     - last pap, pap hx: 2018 neg/neg. no h/o abnormals per pt. collected  - last mammogram, breast hx: n/a  - breast abnormalities: negative for lumps, skin changes, nipple discharge, pain  - last colonoscopy: n/a  - last DEXA scan: n/a     OB hx: Nulligravida  GYN hx:   - menarche, menstrual pattern, LMP: Regular monthly menses.   - Sexual activity/issues, STI protection/history, birth control: not sexually active at this time  - vaginal/vulvar lesions, irritation, or abnormal discharge: no, except as listed above  - HPV vaccine: complete  FH: Mom breast cancer dx age 62yo, BRCA testing negative. No other GYN related cancers including breast, ovarian, endometrial, or colon cancer.      ROS notable for generally healthy  10 point ROS negative except as listed above.     Physical exam  /68   Ht 1.651 m (5' 5\")   Wt 60.8 kg (134 lb)   LMP 2025   BMI 22.30 kg/m²      Physical Exam  Constitutional:       Appearance: Normal appearance.   HENT:      Head: Normocephalic and atraumatic.   Eyes:      Extraocular Movements: Extraocular movements intact.      Conjunctiva/sclera: Conjunctivae normal.      Pupils: Pupils are equal, round, and reactive to light.   Pulmonary:      Effort: Pulmonary effort is normal.   Chest:   Breasts:     Right: Normal.      Left: Normal.   Abdominal:      General: Abdomen is flat.      Palpations: Abdomen is soft.   Genitourinary:     General: Normal vulva.      Vagina: Normal.      Cervix: Normal.      Uterus: Normal.       Adnexa: Right adnexa normal and left adnexa normal.   Skin:     General: Skin is warm and dry.   Neurological:      General: No focal deficit present.      Mental Status: She is alert.   Psychiatric:    "      Mood and Affect: Mood normal.         Behavior: Behavior normal.         Thought Content: Thought content normal.         Judgment: Judgment normal.         Assessment/Plan  - last pap, pap hx: 1/2018 neg/neg. no h/o abnormals per pt. collected  - last mammogram, breast hx: n/a  - breast exam negative today. Discussed breast self awareness.   - last colonoscopy: n/a  - last DEXA scan: n/a  - HPV vaccine: complete  - full panel STI screening  - declines contraception  - prozac refilled as pt currently without PCP.

## 2025-02-22 LAB
HBV SURFACE AG SERPL QL IA: NORMAL
HCV AB SERPL QL IA: NORMAL
HIV 1+2 AB+HIV1 P24 AG SERPL QL IA: NORMAL
T PALLIDUM AB SER QL IA: NORMAL

## 2025-02-24 ASSESSMENT — ENCOUNTER SYMPTOMS
HEMATOLOGIC/LYMPHATIC NEGATIVE: 1
MYALGIAS: 1
EYES NEGATIVE: 1
BACK PAIN: 1
RESPIRATORY NEGATIVE: 1
CARDIOVASCULAR NEGATIVE: 1
CONSTITUTIONAL NEGATIVE: 1
GASTROINTESTINAL NEGATIVE: 1
ARTHRALGIAS: 1
NEUROLOGICAL NEGATIVE: 1
PSYCHIATRIC NEGATIVE: 1

## 2025-02-24 NOTE — PROGRESS NOTES
Subjective   Patient ID: Ca Felix is a 39 y.o. female who presents today, February 20, 2025 for a new patient evaluation and treatment of low back pain.    (1/20) [facility]    Chiropractic Medicine HPI:  Provacative factors include : sitting  Palliative factors incude : heat, movement, stretching  Pain is described as : dull, stiff, tight   Previous treatment for complaint has included : heat, stretching  Patient denies : trauma/accidents/injuries/falls (last 6 months), numbness/tingling, bladder weakness, bowel weakness, difficulty walking, instability, radiating pain into the distal extremity, catching, clicking, grinding, popping  Patient rates least severe pain at : 3/10 on a numerical pain scale  Patient rates most severe pain at : 5/10 on a numerical pain scale   Completed Oswestry Disability Index prior to visit: yes     Initial exam:   Ca Felix presents for evaluation and treatment of low back pain possibly related to severe scoliotic curvature. She states that her symptoms did not present until 2023, but since then she states that her low back discomfort has become progressively more noticeable. She indicates that she experiences tightness along the left side of the low back, but no radiating symptoms into the lower extremities. She states that sitting for extended periods aggravates her symptoms. She reports that her symptom presentation has not disrupted her ability to perform daily activities most of the time, but there are episodes that are more severe and require rest.     Objective   Review of Systems   Constitutional: Negative.    Eyes: Negative.    Respiratory: Negative.     Cardiovascular: Negative.    Gastrointestinal: Negative.    Genitourinary: Negative.    Musculoskeletal:  Positive for arthralgias, back pain and myalgias.   Neurological: Negative.    Hematological: Negative.    Psychiatric/Behavioral: Negative.     All other systems reviewed and are negative.    Physical  Exam  Neurological:      General: No focal deficit present.      Mental Status: She is alert and oriented to person, place, and time.      Cranial Nerves: No dysarthria or facial asymmetry.      Sensory: Sensation is intact.      Motor: Motor function is intact.      Coordination: Coordination is intact.      Gait: Gait is intact.      Spine Musculoskeletal Exam    Gait    Gait is normal.    Inspection    Leg length disparity: right greater than left    Thoracic scoliosis: left    Lumbar scoliosis: left    Rib hump: left    Shoulder elevation: right    Coronal balance: right imbalance    Palpation    Thoracolumbar    Tenderness: none    Right      Masses: none      Spasms: none      Crepitus: none      Muscle tone: normal    Left      Masses: none      Spasms: none      Crepitus: none      Muscle tone: increased    Range of Motion    Thoracolumbar       Flexion: normal. Flexion detail: no pain.     Extension: 75%. Extension detail: no pain.       Right      Lateral bending: normal. Lateral bending detail: no pain.       Lateral rotation: normal. Lateral rotation detail: no pain.       Left      Lateral bendin%. Lateral bending detail: no pain.       Lateral rotation: normal. Lateral rotation detail: no pain.      Strength    Thoracolumbar    Thoracolumbar motor exam is normal.       General    Neurological: alert    Orthopedic Tests:  Thoracic/Lumbar/Sacrum: Lumbosacral Alexandra's  Bilateral and Negative.  Slump Test Bilateral and Negative.  Yeoman's Bilateral and Negative.  Nachlas' Bilateral and Negative.    Segmental Joint(s): Segmental joint dysfunction was assessed with motion palpation and is identified in the following areas:  Thoracic : T5 and T10  Lumbopelvic / Sacral SIJ : L4, L5/S1, and L SIJ    Assessment/Plan   Today's Treatment Included: Spinal manipulation to the following regions of segmental dysfunction identified on examination, using age-appropriate and injury specific force.  Segmental Joint(s)  Thoracic : T4, T5, and T10 Segmental Joint(s) Lumbopelvic/Sacral SIJ : L4, L5/S1, R SIJ, and L SIJ  Chiropractic manipulation treatment techniques utilized: Diversified CMT, Pelvic drop table technique, and Flexion-distraction technique  Soft tissue mobilization techniques utilized during treatment: Cupping    Thoracic Paraspinal mm. bilateral, Middle Trapezius bilateral, Lower Trapezius bilateral, Rhomboids bilateral, and Latissimus Dorsi bilateral  Lumbar Paraspinal mm. bilateral and Thoracolumbar Fascia bilateral    Treatment Plan:   The patient and I discussed the risks and benefits of Chiropractic Care. Consent for care was given both written and orally by the patient.  Based on the patient's subjective complaints along with the examination findings, it is advised that a course of Chiropractic Treatment by initiated in the form of:   Chiropractic Manipulation (CMT)  Neuro-Muscular Re-education  Integrative Dry Needing (IDN)  Chiropractic treatment recommended at a frequency of 1 times per week for 4 weeks as needed for symptom management  The goals of the treatment will be to: decrease pain, increase activity, increase range of motion, reduce lower back pain, improve quality of life, decrease muscular hypertonicity, increase functional capacity, and improve postural strength  I have placed orders for the following:  Massage  The patient tolerated today's treatment with little or no additional discomfort and was instructed to contact the office for questions or concerns.   Follow up as scheduled.

## 2025-02-25 LAB
C TRACH RRNA SPEC QL NAA+PROBE: NEGATIVE
HBV SURFACE AG SERPL QL IA: NORMAL
HCV AB SERPL QL IA: NORMAL
HIV 1+2 AB+HIV1 P24 AG SERPL QL IA: NORMAL
N GONORRHOEA DNA SPEC QL PROBE+SIG AMP: NEGATIVE
T PALLIDUM AB SER QL IA: NEGATIVE

## 2025-03-06 LAB
CYTOLOGY CMNT CVX/VAG CYTO-IMP: NORMAL
HPV HR 12 DNA GENITAL QL NAA+PROBE: NEGATIVE
HPV HR GENOTYPES PNL CVX NAA+PROBE: NEGATIVE
HPV16 DNA SPEC QL NAA+PROBE: NEGATIVE
HPV18 DNA SPEC QL NAA+PROBE: NEGATIVE
LAB AP HPV GENOTYPE QUESTION: YES
LAB AP HPV HR: NORMAL
LAB AP PAP ADDITIONAL TESTS: NORMAL
LABORATORY COMMENT REPORT: NORMAL
LABORATORY COMMENT REPORT: NORMAL
LMP START DATE: NORMAL
PATH REPORT.TOTAL CANCER: NORMAL

## 2025-03-31 ENCOUNTER — APPOINTMENT (OUTPATIENT)
Dept: INTEGRATIVE MEDICINE | Facility: CLINIC | Age: 40
End: 2025-03-31
Payer: COMMERCIAL

## 2025-04-10 ENCOUNTER — APPOINTMENT (OUTPATIENT)
Dept: INTEGRATIVE MEDICINE | Facility: CLINIC | Age: 40
End: 2025-04-10
Payer: COMMERCIAL

## 2025-04-10 DIAGNOSIS — M99.03 SEGMENTAL AND SOMATIC DYSFUNCTION OF LUMBAR REGION: Primary | ICD-10-CM

## 2025-04-10 DIAGNOSIS — M79.10 MYALGIA: ICD-10-CM

## 2025-04-10 DIAGNOSIS — M79.9 POSTURAL STRAIN: ICD-10-CM

## 2025-04-10 DIAGNOSIS — M99.05 SEGMENTAL AND SOMATIC DYSFUNCTION OF PELVIC REGION: ICD-10-CM

## 2025-04-10 DIAGNOSIS — M99.02 SEGMENTAL AND SOMATIC DYSFUNCTION OF THORACIC REGION: ICD-10-CM

## 2025-04-10 DIAGNOSIS — M99.04 SEGMENTAL AND SOMATIC DYSFUNCTION OF SACRAL REGION: ICD-10-CM

## 2025-04-10 DIAGNOSIS — M54.59 MECHANICAL LOW BACK PAIN: ICD-10-CM

## 2025-04-10 PROCEDURE — 98941 CHIROPRACT MANJ 3-4 REGIONS: CPT | Performed by: CHIROPRACTOR

## 2025-04-10 NOTE — PROGRESS NOTES
Subjective   Patient ID: Ca Felix is a 40 y.o. female who presents April 10, 2025 for chiropractic care.    (2/20) [facility]    Today, the patient rates their degree of pain as a 4 out of 10 on the numeric pain rating scale.     Ca returns for continued chiropractic care. She states that she responded well to her initial treatment. However, any relief she experienced was temporary. Today, she presents with continued low back discomfort. The patient denies any changes in health since her last encounter and will follow up as scheduled.    _______________________________________  Initial exam:   Ca Felix presents for evaluation and treatment of low back pain possibly related to severe scoliotic curvature. She states that her symptoms did not present until 2023, but since then she states that her low back discomfort has become progressively more noticeable. She indicates that she experiences tightness along the left side of the low back, but no radiating symptoms into the lower extremities. She states that sitting for extended periods aggravates her symptoms. She reports that her symptom presentation has not disrupted her ability to perform daily activities most of the time, but there are episodes that are more severe and require rest.       Objective   Physical Exam  Neurological:      General: No focal deficit present.      Mental Status: She is alert and oriented to person, place, and time.      Cranial Nerves: No dysarthria or facial asymmetry.      Sensory: Sensation is intact.      Motor: Motor function is intact.      Coordination: Coordination is intact.      Gait: Gait is intact.       Palpation of the following regions revealed:  Thoracic: Thoracic paraspinals bilateral, muscular hypertonicity.  Middle trapezius bilateral, muscular hypertonicity.  Rhomboids bilateral, muscular hypertonicity.  Latissimus dorsi bilateral, muscular hypertonicity.  Lumbar: Lumbar paraspinals bilateral, muscular  hypertonicity.  Quadratus lumborum bilateral, muscular hypertonicity.  Thoracolumbar fascia bilateral, muscular hypertonicity.  Gluteal bilateral, muscular hypertonicity.    Segmental Joint(s): Segmental joint dysfunction was assessed with motion palpation and is identified in the following areas:  Thoracic : T5 and T10  Lumbopelvic / Sacral SIJ : L4, L5/S1, and L SIJ    Assessment/Plan   Today's Treatment Included: Spinal manipulation to the following regions of segmental dysfunction identified on examination, using age-appropriate and injury specific force.  Segmental Joint(s) Thoracic : T4, T5, and T10 Segmental Joint(s) Lumbopelvic/Sacral SIJ : L4, L5/S1, R SIJ, and L SIJ  Chiropractic manipulation treatment techniques utilized: Diversified CMT, Pelvic drop table technique, and Flexion-distraction technique  Soft tissue mobilization techniques utilized during treatment: Pin and Stretch, Cupping, and Manual Dynamic Stretching  Integrative Dry Needling (IDN) - Needles in/out:  10.    Soft-tissue mobilization was performed in the following areas:  Lumbar Paraspinal mm. bilateral, Quadratus Lumborum bilateral, Thoracolumbar Fascia bilateral, Gluteal mm. Glute. Max.  and Glute. Med. bilateral, and Piriformis bilateral    Recommended follow-up in 1 month(s).     The patient tolerated today's treatment with little or no additional discomfort and was instructed to contact the office for questions or concerns.

## 2025-05-15 ENCOUNTER — APPOINTMENT (OUTPATIENT)
Dept: PRIMARY CARE | Facility: CLINIC | Age: 40
End: 2025-05-15
Payer: COMMERCIAL

## 2025-06-09 ENCOUNTER — OFFICE VISIT (OUTPATIENT)
Dept: PRIMARY CARE | Facility: CLINIC | Age: 40
End: 2025-06-09
Payer: COMMERCIAL

## 2025-06-09 VITALS
OXYGEN SATURATION: 97 % | HEART RATE: 64 BPM | DIASTOLIC BLOOD PRESSURE: 70 MMHG | SYSTOLIC BLOOD PRESSURE: 100 MMHG | WEIGHT: 139 LBS | BODY MASS INDEX: 23.16 KG/M2 | HEIGHT: 65 IN | TEMPERATURE: 97 F

## 2025-06-09 DIAGNOSIS — G89.29 CHRONIC MIDLINE LOW BACK PAIN WITHOUT SCIATICA: ICD-10-CM

## 2025-06-09 DIAGNOSIS — Z00.00 ANNUAL PHYSICAL EXAM: Primary | ICD-10-CM

## 2025-06-09 DIAGNOSIS — E55.9 VITAMIN D DEFICIENCY: ICD-10-CM

## 2025-06-09 DIAGNOSIS — Z13.6 SCREENING FOR CARDIOVASCULAR CONDITION: ICD-10-CM

## 2025-06-09 DIAGNOSIS — K21.00 GASTROESOPHAGEAL REFLUX DISEASE WITH ESOPHAGITIS WITHOUT HEMORRHAGE: ICD-10-CM

## 2025-06-09 DIAGNOSIS — M54.50 CHRONIC MIDLINE LOW BACK PAIN WITHOUT SCIATICA: ICD-10-CM

## 2025-06-09 DIAGNOSIS — M67.432 GANGLION CYST OF WRIST, LEFT: ICD-10-CM

## 2025-06-09 DIAGNOSIS — F41.8 ANXIETY WITH DEPRESSION: ICD-10-CM

## 2025-06-09 PROCEDURE — 99214 OFFICE O/P EST MOD 30 MIN: CPT | Performed by: STUDENT IN AN ORGANIZED HEALTH CARE EDUCATION/TRAINING PROGRAM

## 2025-06-09 PROCEDURE — 3008F BODY MASS INDEX DOCD: CPT | Performed by: STUDENT IN AN ORGANIZED HEALTH CARE EDUCATION/TRAINING PROGRAM

## 2025-06-09 PROCEDURE — 99395 PREV VISIT EST AGE 18-39: CPT | Performed by: STUDENT IN AN ORGANIZED HEALTH CARE EDUCATION/TRAINING PROGRAM

## 2025-06-09 RX ORDER — DICLOFENAC SODIUM 10 MG/G
4 GEL TOPICAL 4 TIMES DAILY
Qty: 100 G | Refills: 1 | Status: SHIPPED | OUTPATIENT
Start: 2025-06-09

## 2025-06-09 RX ORDER — FLUOXETINE 10 MG/1
10 CAPSULE ORAL DAILY
Qty: 90 CAPSULE | Refills: 3 | Status: SHIPPED | OUTPATIENT
Start: 2025-06-09

## 2025-06-09 RX ORDER — OMEPRAZOLE 40 MG/1
40 CAPSULE, DELAYED RELEASE ORAL
Qty: 180 CAPSULE | Refills: 1 | Status: SHIPPED | OUTPATIENT
Start: 2025-06-09 | End: 2025-12-06

## 2025-06-09 RX ORDER — CYCLOBENZAPRINE HCL 5 MG
5 TABLET ORAL NIGHTLY PRN
Qty: 30 TABLET | Refills: 0 | Status: CANCELLED | OUTPATIENT
Start: 2025-06-09 | End: 2025-08-08

## 2025-06-09 RX ORDER — TRAZODONE HYDROCHLORIDE 100 MG/1
100 TABLET ORAL DAILY PRN
Qty: 180 TABLET | Refills: 0 | Status: SHIPPED | OUTPATIENT
Start: 2025-06-09 | End: 2025-12-06

## 2025-06-09 RX ORDER — CYCLOBENZAPRINE HCL 5 MG
5 TABLET ORAL NIGHTLY PRN
Qty: 30 TABLET | Refills: 0 | Status: SHIPPED | OUTPATIENT
Start: 2025-06-09 | End: 2025-08-08

## 2025-06-09 RX ORDER — FLUOXETINE 20 MG/1
20 CAPSULE ORAL DAILY
Qty: 90 CAPSULE | Refills: 0 | Status: SHIPPED | OUTPATIENT
Start: 2025-06-09 | End: 2026-06-09

## 2025-06-09 RX ORDER — FLUOXETINE 10 MG/1
10 CAPSULE ORAL DAILY
Qty: 30 CAPSULE | Refills: 0 | Status: SHIPPED | OUTPATIENT
Start: 2025-06-09 | End: 2025-06-09

## 2025-06-09 ASSESSMENT — PATIENT HEALTH QUESTIONNAIRE - PHQ9
SUM OF ALL RESPONSES TO PHQ9 QUESTIONS 1 AND 2: 0
2. FEELING DOWN, DEPRESSED OR HOPELESS: SEVERAL DAYS
1. LITTLE INTEREST OR PLEASURE IN DOING THINGS: NOT AT ALL
SUM OF ALL RESPONSES TO PHQ9 QUESTIONS 1 AND 2: 2
1. LITTLE INTEREST OR PLEASURE IN DOING THINGS: SEVERAL DAYS
2. FEELING DOWN, DEPRESSED OR HOPELESS: NOT AT ALL

## 2025-06-09 ASSESSMENT — ANXIETY QUESTIONNAIRES
2. NOT BEING ABLE TO STOP OR CONTROL WORRYING: NOT AT ALL
3. WORRYING TOO MUCH ABOUT DIFFERENT THINGS: NOT AT ALL
4. TROUBLE RELAXING: NOT AT ALL
6. BECOMING EASILY ANNOYED OR IRRITABLE: NOT AT ALL
GAD7 TOTAL SCORE: 0
7. FEELING AFRAID AS IF SOMETHING AWFUL MIGHT HAPPEN: NOT AT ALL
1. FEELING NERVOUS, ANXIOUS, OR ON EDGE: NOT AT ALL
5. BEING SO RESTLESS THAT IT IS HARD TO SIT STILL: NOT AT ALL
IF YOU CHECKED OFF ANY PROBLEMS ON THIS QUESTIONNAIRE, HOW DIFFICULT HAVE THESE PROBLEMS MADE IT FOR YOU TO DO YOUR WORK, TAKE CARE OF THINGS AT HOME, OR GET ALONG WITH OTHER PEOPLE: NOT DIFFICULT AT ALL

## 2025-06-09 ASSESSMENT — PAIN SCALES - GENERAL: PAINLEVEL_OUTOF10: 2

## 2025-06-09 NOTE — PROGRESS NOTES
Subjective   Ca Felix is a 40 y.o. female who presents for Annual Exam.    HPI:      This is a 40-year-old female presenting as a new patient for establish care visit and routine physical exam.  Prior PCP Prema Cochran, YULISSA-CNP.  Last CPE 3/18/2024.    PREVENTATIVE HEALTH CARE:    Immunizations:  COVID:  DUE  TDaP:  utd - Due 8/2025  Pneumonia:  not currently indicated, former smoker  Shingles:  not currently indicated    Immunization History   Administered Date(s) Administered    DTaP vaccine, pediatric  (INFANRIX) 1986, 1986, 1986, 08/27/1987    Hep B, Unspecified 02/28/2018, 04/25/2018, 08/28/2018    Influenza, seasonal, injectable 10/09/2020    MMR vaccine, subcutaneous (MMR II) 07/09/1987, 05/27/1998    Poliovirus vaccine, subcutaneous (IPOL) 1986, 1986, 1986    Td (adult), unspecified 05/27/1998    Tdap vaccine, age 7 year and older (BOOSTRIX, ADACEL) 08/05/2015       Screenings:  HIV/HCV:  negative x2 2/21/2025 - utd  Pap:  2/21/2025 wnl, HPV negative - utd  Mammogram: Not currently indicated, mother stephanie  Colonoscopy: Not currently indicated    Anxiety/Depression:  Well controlled with fluoxetine 50 mg daily.    GAD7 and PHQ9:  Over the last 2 weeks, how often have you been bothered by any of the following problems?  Feeling nervous, anxious, or on edge: Not at all  Not being able to stop or control worrying: Not at all  Worrying too much about different things: Not at all  Trouble relaxing: Not at all  Being so restless that it is hard to sit still: Not at all  Becoming easily annoyed or irritable: Not at all  Feeling afraid as if something awful might happen: Not at all  NELIDA-7 Total Score: 0      GERD:  Well controlled with omeprazole 40 mg BID.    Insomnia:  Taking trazodone 100 mg at bedtime PRN, working well.    Low Back Pain:  Hx of scoliosis, worsens with exertion.  No history of falls or injury.  Sees chiropractor, does not see  "ortho/spine.      ROS:    Review of systems is essentially negative for all systems except for any identified issues in HPI above.    Objective     /70   Pulse 64   Temp 36.1 °C (97 °F)   Ht 1.651 m (5' 5\")   Wt 63 kg (139 lb)   SpO2 97%   BMI 23.13 kg/m²      PHYSICAL EXAM    GENERAL  Well-appearing, pleasant and cooperative.  No acute distress.    HEENT  HEAD:   Normocephalic.  Atraumatic.  EYES:  PERRLA.  No scleral icterus or conjunctival injection.  EARS:  Tympanic membranes visualized bilaterally without erythema, fluid, or bulging.  NECK:  No adenopathy.  No palpable thyroid enlargement or nodules.    THROAT:  Moist oropharynx without tonsillar enlargement or exudates.    LUNGS:    Clear to auscultation bilaterally.  No wheezes, rales, rhonchi.    CARDIAC:  Regular rate and rhythm.  Normal S1S2.  No murmurs/rubs/gallops.    ABDOMEN:  Soft, non-tender, non-distended.  No hepatosplenomegaly.  Normoactive bowel sounds.    MUSCULOSKELETAL:  No gross abnormalities.   No joint swelling or erythema,.  No spinal or paraspinal tenderness to palpation.    EXTREMITIES:  Raised lesion on anterolateral L wrist without associated erythema, warmth, TTP.   No LE edema or cyanosis.      NEURO           Alert and oriented x3. No focal deficits.    PSYCH:          Affect appropriate.           Assessment/Plan   Problem List Items Addressed This Visit       GERD (gastroesophageal reflux disease)    Relevant Medications    omeprazole (PriLOSEC) 40 mg DR capsule    traZODone (Desyrel) 100 mg tablet    Chronic midline low back pain without sciatica    Relevant Medications    cyclobenzaprine (Flexeril) 5 mg tablet    diclofenac sodium (Voltaren) 1 % gel    Other Relevant Orders    Referral to Physical Therapy    Anxiety with depression    Relevant Medications    FLUoxetine (PROzac) 20 mg capsule     Other Visit Diagnoses         Annual physical exam    -  Primary    Relevant Orders    TSH with reflex to Free T4 if " abnormal    Lipid Panel    CBC    Comprehensive Metabolic Panel      Screening for cardiovascular condition        Relevant Orders    Lipid Panel      Ganglion cyst of wrist, left        Relevant Orders    Referral to Orthopedics and Sports Medicine      Vitamin D deficiency        Relevant Orders    Vitamin D 25-Hydroxy,Total (for eval of Vitamin D levels)          Patient Instructions   It was a pleasure to meet you today.    Go to the lab for fasting blood work, we will notify you of all results.    New prescriptions for Voltaren gel and muscle relaxant (cyclobenzaprine) sent to pharmacy.    Other medication refills also sent today.    Referrals entered for physical therapy and Ortho hand, call to schedule.    Routine follow-up/medication review with me in 6 months, sooner if needed.    Counseling:   Medication education:    Education: The patient is counseled regarding potential side effects of all new medications.    Understanding:  Patient expressed understanding.    Adherence:  No barriers to adherence identified.         Maritza Gilbert MD

## 2025-06-09 NOTE — PATIENT INSTRUCTIONS
It was a pleasure to meet you today.    Go to the lab for fasting blood work, we will notify you of all results.    New prescriptions for Voltaren gel and muscle relaxant (cyclobenzaprine) sent to pharmacy.    Other medication refills also sent today.    Referrals entered for physical therapy and Ortho hand, call to schedule.    Routine follow-up/medication review with me in 6 months, sooner if needed.

## 2025-07-11 ENCOUNTER — APPOINTMENT (OUTPATIENT)
Dept: ORTHOPEDIC SURGERY | Facility: CLINIC | Age: 40
End: 2025-07-11
Payer: COMMERCIAL

## 2025-07-16 NOTE — PROGRESS NOTES
Subjective      Chief Complaint   Patient presents with    Left Wrist - Cyst        No surgery found     HPI  This 40-year-old patient presents today for evaluation of a ganglion cyst on her left wrist. The patient states that this left wrist cyst has been present for over 6 years after a yoga pose. She states that she does not have pain currently.  The patient denies numbness tingling and  strength at the left hand.  She has tried to aspirate the cyst herself with a pin but was unsuccessful.     She also notes a history of scolioses. She states that she was not diagnosed until she was a young adult. Currently she does not have any low back pain, however with activities such as running she notices an increase in low back pain. She denies radicular pain, but she does feel a leg length discrepancy. Denies any loss of bladder or bowel control. The patient presents today and request discussion of further treatment options.    CARDIOLOGY:   Negative for chest pain, shortness of breath.   RESPIRATORY:   Negative for chest pain, shortness of breath.   MUSCULOSKELETAL:   See HPI for details.   NEUROLOGY:   Negative for tingling, numbness, weakness.    Objective    There were no vitals filed for this visit.    Physical Exam  GENERAL:          General Appearance:  This is a pleasant patient with appropriate affect, in no acute distress.   DERMATOLOGY:          Skin: skin at the neck, upper and lower back, and trunk is intact. There is no evidence of skin rash, skin breakdown or ulceration, or atrophic skin change.   EXTREMITIES:          Vascular:  Right, left hands and feet are warm with good color and pulses. Right and left calf and thigh are nontender and nonswollen.   NEUROLOGICAL:          Orientation:  Patient is alert and oriented to person, place, time and situation. Right and left upper and lower extremity motor and sensory examinations are intact.      MUSCULOSKELETAL: Neck: Nontender. No pain with range of  motion. Back: There is scoliosis noted at the thoracolumbar spine with flexion. The patient has no pain with ROM in gentle flexion/extension of the lumbar spine. No pain with ROM of the right or left hips. 5/5 strength at the right and left lower extremities. She is able to walk on her heels and toes with no difficulty. right upper extremity: Nontender.  No pain with gentle range of motion.  Neurovascular is intact to light touch. left upper extremity: There is a firm cyst at the volar radial aspect of the left wrist. It is clean and dry with no evidence of infection. There is no pain or limitation with range of motion at the wrist.  No pain or limitation with range of motion at the fingers.  Phalen's is negative.  Finkelstein's is negative.  Neurovascular is intact to light touch.    Imaging  AP and lateral x-rays of the left wrist show no evidence of acute fracture.     Narrative   Interpreted By:  DAMIEN ATKINS MD  MRN: [id number]  Patient Name: CA NOLAN     STUDY:  SPINE, LUMBOSACRAL  MIN 4 VIEWS;  3/6/2023 3:44 pm     INDICATION:  chronic low back pain.     COMPARISON:  None.     ACCESSION NUMBER(S):  [id number]     ORDERING CLINICIAN:  SANDRA FORTUNE     FINDINGS:  Lumbar spine, multiple views     There is multilevel moderate disc space narrowing with osteophytosis  in the upper lumbar spine worse at L1-L2 and L2-L3. There is no  fracture. There is no spondylolisthesis. There is severe  levocurvature of the lumbar spine centered at the thoracolumbar  junction      Impression   Moderate spondylosis at L1-L2 and L2-L3.  Severe levocurvature at the thoracolumbar junction       Cardiology, Vascular, and Other Imaging  XR wrist left 3+ views  Result Date: 7/18/2025  These images are not reportable by radiology and will not be interpreted by  Radiologists.         Ca was seen today for cyst.  Diagnoses and all orders for this visit:  Left wrist pain (Primary)  -     Referral to Orthopedics and Sports  Medicine; Future  Ganglion cyst of wrist, left  -     XR wrist left 3+ views; Future  -     Referral to Orthopedics and Sports Medicine; Future  Other orders  -     Referral to Orthopedics and Sports Medicine     Options are discussed with the patient in detail. The patient is given a prescription for physical therapy to evaluate and treat her low back pain with gentle strengthening and ROM exercises with modalities as needed. She denies pain at the left wrist cyst, however inquires about permanent removal. She is given a referral for Dr. Yoon for further evaluation and treatment of this cyst.  The patient is instructed regarding activity modification and risk for further injury with falling or trauma, ice, provider directed at home gentle strengthening and ROM exercises, and the appropriate use of Tylenol as needed for pain with its potential adverse reactions and side effects. The patient understands. Follow up in 4-6 weeks for re-evaluation or sooner as needed. Please note that this report has been produced using speech recognition software.  It may contain errors related to grammar, punctuation or spelling.  Electronically signed, but not reviewed.     Scarlett Mera PA-C

## 2025-07-18 ENCOUNTER — HOSPITAL ENCOUNTER (OUTPATIENT)
Dept: RADIOLOGY | Facility: CLINIC | Age: 40
Discharge: HOME | End: 2025-07-18
Payer: COMMERCIAL

## 2025-07-18 ENCOUNTER — OFFICE VISIT (OUTPATIENT)
Dept: ORTHOPEDIC SURGERY | Facility: CLINIC | Age: 40
End: 2025-07-18
Payer: COMMERCIAL

## 2025-07-18 VITALS — WEIGHT: 133 LBS | BODY MASS INDEX: 22.16 KG/M2 | HEIGHT: 65 IN

## 2025-07-18 DIAGNOSIS — M41.9 SCOLIOSIS OF THORACOLUMBAR SPINE, UNSPECIFIED SCOLIOSIS TYPE: ICD-10-CM

## 2025-07-18 DIAGNOSIS — M54.50 CHRONIC MIDLINE LOW BACK PAIN WITHOUT SCIATICA: ICD-10-CM

## 2025-07-18 DIAGNOSIS — M67.432 GANGLION CYST OF WRIST, LEFT: ICD-10-CM

## 2025-07-18 DIAGNOSIS — G89.29 CHRONIC MIDLINE LOW BACK PAIN WITHOUT SCIATICA: ICD-10-CM

## 2025-07-18 DIAGNOSIS — M47.816 PRIMARY OSTEOARTHRITIS OF LUMBAR SPINE: ICD-10-CM

## 2025-07-18 DIAGNOSIS — M25.532 LEFT WRIST PAIN: Primary | ICD-10-CM

## 2025-07-18 PROCEDURE — 73110 X-RAY EXAM OF WRIST: CPT | Mod: LT

## 2025-07-18 PROCEDURE — 99203 OFFICE O/P NEW LOW 30 MIN: CPT | Performed by: PHYSICIAN ASSISTANT

## 2025-07-18 PROCEDURE — 3008F BODY MASS INDEX DOCD: CPT | Performed by: PHYSICIAN ASSISTANT

## 2025-07-18 ASSESSMENT — LIFESTYLE VARIABLES
SKIP TO QUESTIONS 9-10: 1
AUDIT TOTAL SCORE: 2
HOW OFTEN DURING THE LAST YEAR HAVE YOU FOUND THAT YOU WERE NOT ABLE TO STOP DRINKING ONCE YOU HAD STARTED: NEVER
HAVE YOU OR SOMEONE ELSE BEEN INJURED AS A RESULT OF YOUR DRINKING: NO
HOW OFTEN DURING THE LAST YEAR HAVE YOU NEEDED AN ALCOHOLIC DRINK FIRST THING IN THE MORNING TO GET YOURSELF GOING AFTER A NIGHT OF HEAVY DRINKING: NEVER
HOW OFTEN DURING THE LAST YEAR HAVE YOU BEEN UNABLE TO REMEMBER WHAT HAPPENED THE NIGHT BEFORE BECAUSE YOU HAD BEEN DRINKING: NEVER
HAS A RELATIVE, FRIEND, DOCTOR, OR ANOTHER HEALTH PROFESSIONAL EXPRESSED CONCERN ABOUT YOUR DRINKING OR SUGGESTED YOU CUT DOWN: NO
HOW OFTEN DO YOU HAVE A DRINK CONTAINING ALCOHOL: 2-4 TIMES A MONTH
HOW OFTEN DURING THE LAST YEAR HAVE YOU FAILED TO DO WHAT WAS NORMALLY EXPECTED FROM YOU BECAUSE OF DRINKING: NEVER
HOW MANY STANDARD DRINKS CONTAINING ALCOHOL DO YOU HAVE ON A TYPICAL DAY: 1 OR 2
HOW OFTEN DO YOU HAVE SIX OR MORE DRINKS ON ONE OCCASION: NEVER
AUDIT-C TOTAL SCORE: 2
HOW OFTEN DURING THE LAST YEAR HAVE YOU HAD A FEELING OF GUILT OR REMORSE AFTER DRINKING: NEVER

## 2025-07-18 ASSESSMENT — PAIN SCALES - GENERAL: PAINLEVEL_OUTOF10: 0-NO PAIN

## 2025-07-18 ASSESSMENT — COLUMBIA-SUICIDE SEVERITY RATING SCALE - C-SSRS
6. HAVE YOU EVER DONE ANYTHING, STARTED TO DO ANYTHING, OR PREPARED TO DO ANYTHING TO END YOUR LIFE?: NO
2. HAVE YOU ACTUALLY HAD ANY THOUGHTS OF KILLING YOURSELF?: NO
1. IN THE PAST MONTH, HAVE YOU WISHED YOU WERE DEAD OR WISHED YOU COULD GO TO SLEEP AND NOT WAKE UP?: NO

## 2025-07-18 ASSESSMENT — ENCOUNTER SYMPTOMS
DEPRESSION: 0
OCCASIONAL FEELINGS OF UNSTEADINESS: 0
LOSS OF SENSATION IN FEET: 0

## 2025-07-18 ASSESSMENT — PAIN - FUNCTIONAL ASSESSMENT: PAIN_FUNCTIONAL_ASSESSMENT: NO/DENIES PAIN

## 2025-07-18 ASSESSMENT — PATIENT HEALTH QUESTIONNAIRE - PHQ9
2. FEELING DOWN, DEPRESSED OR HOPELESS: NOT AT ALL
1. LITTLE INTEREST OR PLEASURE IN DOING THINGS: NOT AT ALL
SUM OF ALL RESPONSES TO PHQ9 QUESTIONS 1 AND 2: 0